# Patient Record
(demographics unavailable — no encounter records)

---

## 2025-06-19 NOTE — ASSESSMENT
[FreeTextEntry1] : Impression: Patient most likely has GERD with significant functional dyspepsia component/esophageal spasm and hypersensitivity as evidenced by globus sensation.  Currently asymptomatic so no need for treatment.  Family history of colon cancer last colonoscopy 4-1/2 years ago  Plan: Repeat screening colonoscopy due to high risk of colon cancer.  Do EGD with biopsy as well in light of above symptoms.  If symptoms recur will prescribe PPI.  May benefit from neuromodulator if poor response.

## 2025-06-19 NOTE — PHYSICAL EXAM

## 2025-06-19 NOTE — HISTORY OF PRESENT ILLNESS
[FreeTextEntry1] : Patient seen 2 years ago for episode of upper abdominal pain which ultimately resolved spontaneously.  Sonogram done at that time was normal.  Was well until about 6 weeks ago when again began experiencing upper abdominal pain, crampy/squeezing in nature occurring after a meal.  Also has been experiencing some discomfort in throat with the feeling of pills getting hung up upon swallowing.  No heartburn nausea vomiting early satiety or weight loss.  Had an EGD 10 years ago for iron-deficiency anemia which was negative.  Colonoscopy at that time negative as well as was colonoscopy in 2020.  3 to 5-year follow-up given due to family history of colon cancer.  Takes gabapentin but not daily for neuropathy symptoms.